# Patient Record
Sex: FEMALE | Race: BLACK OR AFRICAN AMERICAN | Employment: PART TIME | URBAN - METROPOLITAN AREA
[De-identification: names, ages, dates, MRNs, and addresses within clinical notes are randomized per-mention and may not be internally consistent; named-entity substitution may affect disease eponyms.]

---

## 2017-08-07 ENCOUNTER — HOSPITAL ENCOUNTER (EMERGENCY)
Facility: HOSPITAL | Age: 47
Discharge: HOME | End: 2017-08-07
Attending: EMERGENCY MEDICINE
Payer: COMMERCIAL

## 2017-08-07 VITALS
HEIGHT: 64 IN | TEMPERATURE: 97 F | DIASTOLIC BLOOD PRESSURE: 64 MMHG | OXYGEN SATURATION: 99 % | WEIGHT: 110 LBS | SYSTOLIC BLOOD PRESSURE: 96 MMHG | BODY MASS INDEX: 18.78 KG/M2 | HEART RATE: 84 BPM | RESPIRATION RATE: 16 BRPM

## 2017-08-07 DIAGNOSIS — R42 DIZZINESS: ICD-10-CM
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
## 2017-08-07 PROCEDURE — 99282 EMERGENCY DEPT VISIT SF MDM: CPT
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
## (undated) NOTE — ED AVS SNAPSHOT
Carol Ann Senegal   MRN: [id number]    Department:  [facility] Emergency Department   Date of Visit:  8/7/2017           Disclosure     Insurance plans vary and the physician(s) referred by the ER may not be covered by your plan.  Please conta If you have been prescribed any medication(s), please fill your prescription right away and begin taking the medication(s) as directed    If the emergency physician has read X-rays, these will be re-interpreted by a radiologist.  If there is a significant

## (undated) NOTE — LETTER
August 7, 2017    Patient: Jolene Amaya   Date of Visit: 8/7/2017       To Whom It May Concern:    Jolene Amaya was seen and treated in our emergency department on 8/7/2017. She should not return to work until 8/8.     If you have any questions